# Patient Record
Sex: MALE | Race: BLACK OR AFRICAN AMERICAN | NOT HISPANIC OR LATINO | URBAN - METROPOLITAN AREA
[De-identification: names, ages, dates, MRNs, and addresses within clinical notes are randomized per-mention and may not be internally consistent; named-entity substitution may affect disease eponyms.]

---
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
## 2023-01-18 DIAGNOSIS — M79.645 PAIN OF LEFT THUMB: Primary | ICD-10-CM

## 2023-01-19 ENCOUNTER — TELEPHONE (OUTPATIENT)
Dept: ORTHOPEDICS | Facility: CLINIC | Age: 18
End: 2023-01-19

## 2023-01-19 NOTE — TELEPHONE ENCOUNTER
Spoke with the patient. Informed of X-rays scheduled prior to appointment. Patient verbalized understanding and was thankful.

## 2023-01-20 ENCOUNTER — OFFICE VISIT (OUTPATIENT)
Dept: ORTHOPEDICS | Facility: CLINIC | Age: 18
End: 2023-01-20

## 2023-01-20 ENCOUNTER — HOSPITAL ENCOUNTER (OUTPATIENT)
Dept: RADIOLOGY | Facility: HOSPITAL | Age: 18
Discharge: HOME | End: 2023-01-20
Attending: PHYSICIAN ASSISTANT

## 2023-01-20 DIAGNOSIS — S63.642A SPRAIN OF METACARPOPHALANGEAL (MCP) JOINT OF LEFT THUMB, INITIAL ENCOUNTER: Primary | ICD-10-CM

## 2023-01-20 DIAGNOSIS — M79.645 PAIN OF LEFT THUMB: ICD-10-CM

## 2023-01-20 PROCEDURE — 73140 XR FINGER 2 OR MORE VIEWS LEFT: ICD-10-PCS | Mod: 26,LT,, | Performed by: RADIOLOGY

## 2023-01-20 PROCEDURE — 99999 PR PBB SHADOW E&M-EST. PATIENT-LVL II: CPT | Mod: PBBFAC,,, | Performed by: PHYSICIAN ASSISTANT

## 2023-01-20 PROCEDURE — 73140 X-RAY EXAM OF FINGER(S): CPT | Mod: 26,LT,, | Performed by: RADIOLOGY

## 2023-01-20 PROCEDURE — 73140 X-RAY EXAM OF FINGER(S): CPT | Mod: TC,PO,LT

## 2023-01-20 PROCEDURE — 99212 OFFICE O/P EST SF 10 MIN: CPT | Mod: PBBFAC,PO | Performed by: PHYSICIAN ASSISTANT

## 2023-01-20 PROCEDURE — 99203 PR OFFICE/OUTPT VISIT, NEW, LEVL III, 30-44 MIN: ICD-10-PCS | Mod: S$PBB,,, | Performed by: PHYSICIAN ASSISTANT

## 2023-01-20 PROCEDURE — 99203 OFFICE O/P NEW LOW 30 MIN: CPT | Mod: S$PBB,,, | Performed by: PHYSICIAN ASSISTANT

## 2023-01-20 PROCEDURE — 99999 PR PBB SHADOW E&M-EST. PATIENT-LVL II: ICD-10-PCS | Mod: PBBFAC,,, | Performed by: PHYSICIAN ASSISTANT

## 2023-01-20 NOTE — PROGRESS NOTES
[facility]  History & Physical  Orthopedics    SUBJECTIVE:      Chief Complaint: Left thumb swelling    Referring Provider: Pan Kaur Dr. is the supervising physician for this encounter/patient    History of Present Illness:  Patient is a 17 y.o. left hand dominant male who presents today with complaints of left thumb injury occurred 4 weeks ago, while at work as a , he thinks he may  have jammed the thumb wiping a table. He was seen on 12/24/22 about 1 week later, xrays were concerning for possible avulsion fracture of the proximal phalanx. He wore a brace, but found this was sometimes more painful. Today, he reports 0/10 pain, but says the joint looks different than the right side.     The patient is a/an restaurant .    Onset of symptoms/DOI was 4 weeks.    Symptoms are aggravated by  none .    Symptoms are alleviated by  none .    Symptoms consist of swelling.    The patient rates their pain as a 0/10.    Attempted treatment(s) and/or interventions include activity modifications, rest, immobilization.     The patient denies any fevers, chills, N/V, D/C and presents for evaluation.       History reviewed. No pertinent past medical history.  History reviewed. No pertinent surgical history.  Review of patient's allergies indicates:  No Known Allergies  Social History     Social History Narrative    Not on file     No family history on file.    No current outpatient medications on file.      Review of Systems:  Constitutional: no fever or chills  Eyes: no visual changes  ENT: no nasal congestion or sore throat  Respiratory: no cough or shortness of breath  Cardiovascular: no chest pain  Gastrointestinal: no nausea or vomiting, tolerating diet  Musculoskeletal:  swelling    OBJECTIVE:      Vital Signs (Most Recent):  There were no vitals filed for this visit.  There is no height or weight on file to calculate BMI.      Physical Exam:  Constitutional: The patient  appears well-developed and well-nourished. No distress.   Skin: No lesions appreciated  Head: Normocephalic and atraumatic.   Nose: Nose normal.   Ears: No deformities seen  Eyes: Conjunctivae and EOM are normal.   Neck: No tracheal deviation present.   Cardiovascular: Normal rate and intact distal pulses.    Pulmonary/Chest: Effort normal. No respiratory distress.   Abdominal: There is no guarding.   Neurological: The patient is alert.   Psychiatric: The patient has a normal mood and affect.     Left Hand/Wrist Examination:    Observation/Inspection:  Swelling  Mild compared to the right thumb MCP    Deformity  none  Discoloration  none     Scars   none    Atrophy  none    HAND/WRIST EXAMINATION:  Finkelstein's Test   Neg  WHAT Test    Neg  Snuff box tenderness   Neg  Mayes's Test    Neg  Hook of Hamate Tenderness  Neg  CMC grind    Neg  Circumduction test   Neg  NTTP to the radial/ulnar boarder of the MCP, A1 pulley of the thumb    Neurovascular Exam:  Digits WWP, brisk CR < 3s throughout  NVI motor/LTS to M/R/U nerves, radial pulse 2+  Tinel's Test - Carpal Tunnel  Neg  Tinel's Test - Cubital Tunnel  Neg  Phalen's Test    Neg  Median Nerve Compression Test Neg    ROM hand full, painless. No laxity noted with radial/ulnar stress of the MCP.    ROM wrist full, painless    ROM elbow full, painless    Abdomen not guarded  Respirations nonlabored  Perfusion intact    Diagnostic Results:     Imaging - I independently viewed the patient's imaging as well as the radiology report.      FINDINGS:  Skeletal structures are intact.  No fracture or a dislocation is identified.  Prior exam reported possible small avulsion next to the base of the proximal phalanx, but this is not definitely identified on the current exam.  Joint spaces look normal.  No significant soft tissue swelling detected.     Impression:     No fracture identified      ASSESSMENT/PLAN:      17 y.o. yo male with Left thumb MCP sprain, 4 weeks from  injury    Plan: The patient and I had a thorough discussion today.  We discussed the working diagnosis as well as several other potential alternative diagnoses.  Treatment options were discussed, both conservative and surgical.  Conservative treatment options would include things such as activity modifications, workplace modifications, a period of rest, oral vs topical OTC and prescription anti-inflammatory medications, occupational therapy, splinting/bracing, immobilization, corticosteroid injections, and others.  Surgical options were discussed as well.     At this time, the patient would like to proceed with a trial of voltaren gel massage, ice/heat, motion and activity as tolerated. RTC on prn basis.    Should the patient's symptoms worsen, persist, or fail to improve they should return for reevaluation and I would be happy to see them back anytime.           Please do not hesitate to reach out to us via email, phone, or MyChart with any questions, concerns, or feedback.

## 2023-11-02 ENCOUNTER — HOSPITAL ENCOUNTER (EMERGENCY)
Facility: HOSPITAL | Age: 18
Discharge: HOME | End: 2023-11-02
Attending: EMERGENCY MEDICINE

## 2023-11-02 VITALS
HEART RATE: 77 BPM | SYSTOLIC BLOOD PRESSURE: 122 MMHG | OXYGEN SATURATION: 99 % | BODY MASS INDEX: 17.63 KG/M2 | TEMPERATURE: 99 F | WEIGHT: 130 LBS | RESPIRATION RATE: 18 BRPM | DIASTOLIC BLOOD PRESSURE: 84 MMHG

## 2023-11-02 DIAGNOSIS — Z02.89 ENCOUNTER TO OBTAIN EXCUSE FROM WORK: Primary | ICD-10-CM

## 2023-11-02 PROCEDURE — 99281 EMR DPT VST MAYX REQ PHY/QHP: CPT

## 2023-11-02 NOTE — ED PROVIDER NOTES
Encounter Date: 11/2/2023       History     Chief Complaint   Patient presents with    Letter for School/Work     Needs work note for today. No SYMPTOMS WHATSOEVER. Says he was throwing up yesterday.      18-year-old male, healthy, here asking for a note to return to work.  Patient states that last week he had a headache and vomiting and this prevented him from going to work.  He states that the symptoms have completely resolved and he feels back to normal but needs a letter stating that he is safe to return.  He has no fevers, chills, headache, vomiting, URI symptoms at present time.    The history is provided by the patient.     Review of patient's allergies indicates:  No Known Allergies  History reviewed. No pertinent past medical history.  History reviewed. No pertinent surgical history.  History reviewed. No pertinent family history.     Review of Systems    Physical Exam     Initial Vitals [11/02/23 0945]   BP Pulse Resp Temp SpO2   120/80 73 18 98 °F (36.7 °C) 97 %      MAP       --         Physical Exam    Nursing note and vitals reviewed.  Constitutional: Vital signs are normal. He appears well-developed and well-nourished. He is not diaphoretic.  Non-toxic appearance. He does not appear ill. No distress.   HENT:   Head: Normocephalic and atraumatic.   Mouth/Throat: Mucous membranes are normal. Mucous membranes are not dry.   Eyes: Conjunctivae and lids are normal.   Neck: Neck supple.   Normal range of motion.  Cardiovascular:  Normal rate.           Pulmonary/Chest: No respiratory distress.   Musculoskeletal:      Cervical back: Normal range of motion and neck supple.     Neurological: He is alert and oriented to person, place, and time. He has normal strength. GCS score is 15. GCS eye subscore is 4. GCS verbal subscore is 5. GCS motor subscore is 6.   Skin: Skin is dry and intact. No pallor.   Psychiatric: He has a normal mood and affect. His speech is normal and behavior is normal.         ED Course    Procedures  Labs Reviewed - No data to display       Imaging Results    None          Medications - No data to display  Medical Decision Making  Patient here for a note to return to work, has no acute medical complaints, well-appearing clinically with normal vital signs.    Note provided                               Clinical Impression:   Final diagnoses:  [Z02.89] Encounter to obtain excuse from work (Primary)        ED Disposition Condition    Discharge Stable          ED Prescriptions    None       Follow-up Information       Follow up With Specialties Details Why Contact Info    [facility] - Emergency Dept Emergency Medicine  If symptoms worsen [id number]  [facility] [id number] [phone]             Che Santizo MD  11/02/23 1168

## 2023-11-02 NOTE — ED TRIAGE NOTES
Pt. Is a 18 y.o. male presenting to the ED with CC headache and vomiting on Friday. Pt. Reports he now needs a note for work. Pt. Denies any symptoms at this time.

## 2023-11-02 NOTE — Clinical Note
"Tim Marinelli" Nikolay was seen and treated in our emergency department on 11/2/2023.  He may return to work on 11/02/2023.       If you have any questions or concerns, please don't hesitate to call.      Che Santizo MD"